# Patient Record
Sex: MALE | Race: WHITE | NOT HISPANIC OR LATINO | Employment: FULL TIME | URBAN - METROPOLITAN AREA
[De-identification: names, ages, dates, MRNs, and addresses within clinical notes are randomized per-mention and may not be internally consistent; named-entity substitution may affect disease eponyms.]

---

## 2024-01-31 ENCOUNTER — OFFICE VISIT (OUTPATIENT)
Dept: PRIMARY CARE | Facility: CLINIC | Age: 44
End: 2024-01-31
Payer: COMMERCIAL

## 2024-01-31 VITALS
HEIGHT: 75 IN | OXYGEN SATURATION: 100 % | DIASTOLIC BLOOD PRESSURE: 86 MMHG | HEART RATE: 57 BPM | WEIGHT: 212 LBS | TEMPERATURE: 97.5 F | SYSTOLIC BLOOD PRESSURE: 127 MMHG | BODY MASS INDEX: 26.36 KG/M2

## 2024-01-31 DIAGNOSIS — R07.9 CHEST PAIN, UNSPECIFIED TYPE: Primary | ICD-10-CM

## 2024-01-31 PROBLEM — B00.1 RECURRENT COLD SORES: Status: ACTIVE | Noted: 2024-01-31

## 2024-01-31 PROBLEM — E78.00 ELEVATED LDL CHOLESTEROL LEVEL: Status: ACTIVE | Noted: 2024-01-31

## 2024-01-31 PROBLEM — E55.9 VITAMIN D DEFICIENCY: Status: ACTIVE | Noted: 2024-01-31

## 2024-01-31 PROCEDURE — 99213 OFFICE O/P EST LOW 20 MIN: CPT | Performed by: INTERNAL MEDICINE

## 2024-01-31 PROCEDURE — 1036F TOBACCO NON-USER: CPT | Performed by: INTERNAL MEDICINE

## 2024-01-31 PROCEDURE — 93000 ELECTROCARDIOGRAM COMPLETE: CPT | Performed by: INTERNAL MEDICINE

## 2024-01-31 RX ORDER — VALACYCLOVIR HYDROCHLORIDE 500 MG/1
500 TABLET, FILM COATED ORAL DAILY
COMMUNITY
End: 2024-04-24

## 2024-01-31 RX ORDER — ATORVASTATIN CALCIUM 20 MG/1
20 TABLET, FILM COATED ORAL NIGHTLY
COMMUNITY

## 2024-01-31 ASSESSMENT — PATIENT HEALTH QUESTIONNAIRE - PHQ9
1. LITTLE INTEREST OR PLEASURE IN DOING THINGS: NOT AT ALL
2. FEELING DOWN, DEPRESSED OR HOPELESS: NOT AT ALL
SUM OF ALL RESPONSES TO PHQ9 QUESTIONS 1 AND 2: 0

## 2024-01-31 ASSESSMENT — ENCOUNTER SYMPTOMS
WHEEZING: 0
FEVER: 0
VOMITING: 0
CHILLS: 0
ARTHRALGIAS: 0
DYSURIA: 0
CONSTIPATION: 0
NAUSEA: 0
BACK PAIN: 0
PALPITATIONS: 0
DIARRHEA: 0
DIZZINESS: 0
FATIGUE: 0
ABDOMINAL PAIN: 0
COUGH: 0
SHORTNESS OF BREATH: 0
LIGHT-HEADEDNESS: 0
HEMATURIA: 0

## 2024-01-31 ASSESSMENT — PAIN SCALES - GENERAL: PAINLEVEL: 3

## 2024-01-31 NOTE — PROGRESS NOTES
CHIEF COMPLAINT  Follow-up (When running has chest muscle hurts sticks around and varies in time )    HISTORY OF PRESENT ILLNESS  Jacobo Higuera is a 43 y.o. male presents today for follow up of Follow-up (When running has chest muscle hurts sticks around and varies in time )    HPI    Cortisone shot in right heel last month.  Was advised to not run for a few weeks to let his heel heal.  Started running again.  The first time, after his run, he felt some pain across his chest, thought it was his muscles.  Seems to be getting worse - requires less time running for symptoms to begin and more intense.  Tried to run this morning - triggers the pain across the chest, had to stop.  No dizziness, diaphoresis, nausea.  The pain is across the chest, but more so on the left.  Also occurred when shoveling snow.  Lately the pain improves, but still lingers there.  Sometimes feels tightness in his chest like he just worked out, but otherwise achy.  At first he thought it was triggered by cold air, but then one morning it was 50 degrees and it still happened.   No wheezing.    REVIEW OF SYSTEMS  Review of Systems   Constitutional:  Negative for chills, fatigue and fever.   Respiratory:  Negative for cough, shortness of breath and wheezing.    Cardiovascular:  Positive for chest pain. Negative for palpitations and leg swelling.   Gastrointestinal:  Negative for abdominal pain, constipation, diarrhea, nausea and vomiting.   Genitourinary:  Negative for dysuria and hematuria.   Musculoskeletal:  Negative for arthralgias, back pain and gait problem.   Skin:  Negative for rash.   Neurological:  Negative for dizziness and light-headedness.       ALLERGIES  Patient has no known allergies.    MEDICATIONS  Current Outpatient Medications   Medication Instructions    atorvastatin (LIPITOR) 20 mg, oral, Nightly    valACYclovir (VALTREX) 500 mg, oral, Daily       TOBACCO USE  Social History     Tobacco Use   Smoking Status Never   Smokeless  "Tobacco Never       DEPRESSION SCREEN  Over the past 2 weeks, how often have you been bothered by any of the following problems?  Little interest or pleasure in doing things: Not at all  Feeling down, depressed, or hopeless: Not at all    SURGICAL HISTORY  No past surgical history on file.       OBJECTIVE    /86   Pulse 57   Temp 36.4 °C (97.5 °F) (Temporal)   Ht 1.905 m (6' 3\")   Wt 96.2 kg (212 lb)   SpO2 100%   BMI 26.50 kg/m²    BMI: Estimated body mass index is 26.5 kg/m² as calculated from the following:    Height as of this encounter: 1.905 m (6' 3\").    Weight as of this encounter: 96.2 kg (212 lb).    BP Readings from Last 3 Encounters:   01/31/24 127/86   05/02/23 99/79   05/05/22 113/73      Wt Readings from Last 3 Encounters:   01/31/24 96.2 kg (212 lb)   05/02/23 99 kg (218 lb 3 oz)   05/05/22 96.2 kg (212 lb 1 oz)        PHYSICAL EXAM  Physical Exam  Constitutional:       Appearance: Normal appearance.   HENT:      Head: Normocephalic and atraumatic.   Cardiovascular:      Rate and Rhythm: Normal rate and regular rhythm.      Heart sounds: No murmur heard.  Pulmonary:      Effort: Pulmonary effort is normal. No respiratory distress.      Breath sounds: Normal breath sounds. No wheezing.   Abdominal:      General: There is no distension.      Palpations: Abdomen is soft.      Tenderness: There is no abdominal tenderness.   Neurological:      General: No focal deficit present.      Mental Status: He is alert and oriented to person, place, and time. Mental status is at baseline.   Psychiatric:         Mood and Affect: Mood normal.         Behavior: Behavior normal.         Thought Content: Thought content normal.         Judgment: Judgment normal.           ASSESSMENT AND PLAN  Assessment/Plan   Problem List Items Addressed This Visit       Chest pain - Primary    Relevant Orders    Stress Test    ECG 12 lead (Clinic Performed)    ECG 12 Lead (Completed)     Chest pain - triggered by exertion. "  EKG today in office shows sinus bradycardia (HR 54).  Vitals are OK.  Denies wheezing.  The pain abates with rest, but lingers at mild level for a while afterwards.  Obtain exercise stress test.  Will reassess after this is complete.  Advised that if symptoms come on with rest, if they are more severe, or do not resolve, should go to ER.

## 2024-02-19 ENCOUNTER — HOSPITAL ENCOUNTER (OUTPATIENT)
Dept: CARDIOLOGY | Facility: CLINIC | Age: 44
Discharge: HOME | End: 2024-02-19
Payer: COMMERCIAL

## 2024-02-19 DIAGNOSIS — R07.9 CHEST PAIN, UNSPECIFIED TYPE: ICD-10-CM

## 2024-02-19 PROCEDURE — 93017 CV STRESS TEST TRACING ONLY: CPT

## 2024-02-19 PROCEDURE — 93018 CV STRESS TEST I&R ONLY: CPT | Performed by: STUDENT IN AN ORGANIZED HEALTH CARE EDUCATION/TRAINING PROGRAM

## 2024-02-19 PROCEDURE — 93016 CV STRESS TEST SUPVJ ONLY: CPT | Performed by: STUDENT IN AN ORGANIZED HEALTH CARE EDUCATION/TRAINING PROGRAM

## 2024-02-27 DIAGNOSIS — R06.09 DYSPNEA ON EXERTION: Primary | ICD-10-CM

## 2024-02-28 RX ORDER — ALBUTEROL SULFATE 90 UG/1
AEROSOL, METERED RESPIRATORY (INHALATION)
Qty: 8.5 G | Refills: 3 | Status: SHIPPED | OUTPATIENT
Start: 2024-02-28

## 2024-04-24 DIAGNOSIS — B00.1 HERPESVIRAL VESICULAR DERMATITIS: ICD-10-CM

## 2024-04-24 RX ORDER — VALACYCLOVIR HYDROCHLORIDE 500 MG/1
500 TABLET, FILM COATED ORAL DAILY
Qty: 90 TABLET | Refills: 3 | Status: SHIPPED | OUTPATIENT
Start: 2024-04-24

## 2024-06-24 ENCOUNTER — APPOINTMENT (OUTPATIENT)
Dept: PRIMARY CARE | Facility: CLINIC | Age: 44
End: 2024-06-24
Payer: COMMERCIAL

## 2024-06-24 VITALS
SYSTOLIC BLOOD PRESSURE: 106 MMHG | HEIGHT: 75 IN | HEART RATE: 65 BPM | BODY MASS INDEX: 25.78 KG/M2 | WEIGHT: 207.3 LBS | DIASTOLIC BLOOD PRESSURE: 77 MMHG | OXYGEN SATURATION: 97 % | TEMPERATURE: 97.2 F

## 2024-06-24 DIAGNOSIS — E78.00 ELEVATED LDL CHOLESTEROL LEVEL: ICD-10-CM

## 2024-06-24 DIAGNOSIS — B00.1 HERPESVIRAL VESICULAR DERMATITIS: ICD-10-CM

## 2024-06-24 DIAGNOSIS — Z00.00 HEALTHCARE MAINTENANCE: Primary | ICD-10-CM

## 2024-06-24 LAB
NON-UH HIE A/G RATIO: 1.3
NON-UH HIE ALB: 3.9 G/DL (ref 3.4–5)
NON-UH HIE ALK PHOS: 72 UNIT/L (ref 45–117)
NON-UH HIE BASO COUNT: 0.04 X1000 (ref 0–0.2)
NON-UH HIE BASOS %: 0.6 %
NON-UH HIE BILIRUBIN, TOTAL: 0.7 MG/DL (ref 0.3–1.2)
NON-UH HIE BUN/CREAT RATIO: 17.5
NON-UH HIE BUN: 21 MG/DL (ref 9–23)
NON-UH HIE CALCIUM: 9.3 MG/DL (ref 8.7–10.4)
NON-UH HIE CALCULATED LDL CHOLESTEROL: 73 MG/DL (ref 60–130)
NON-UH HIE CALCULATED OSMOLALITY: 286 MOSM/KG (ref 275–295)
NON-UH HIE CHLORIDE: 108 MMOL/L (ref 98–107)
NON-UH HIE CHOLESTEROL: 137 MG/DL (ref 100–200)
NON-UH HIE CO2, VENOUS: 29 MMOL/L (ref 20–31)
NON-UH HIE CREATININE: 1.2 MG/DL (ref 0.6–1.1)
NON-UH HIE DIFF?: NO
NON-UH HIE EOS COUNT: 0.06 X1000 (ref 0–0.5)
NON-UH HIE EOSIN %: 1 %
NON-UH HIE GFR AA: >60
NON-UH HIE GLOBULIN: 3 G/DL
NON-UH HIE GLOMERULAR FILTRATION RATE: >60 ML/MIN/1.73M?
NON-UH HIE GLUCOSE: 97 MG/DL (ref 74–106)
NON-UH HIE GOT: 22 UNIT/L (ref 15–37)
NON-UH HIE GPT: 29 UNIT/L (ref 10–49)
NON-UH HIE HCT: 45.2 % (ref 41–52)
NON-UH HIE HDL CHOLESTEROL: 42 MG/DL (ref 40–60)
NON-UH HIE HGB: 15.1 G/DL (ref 13.5–17.5)
NON-UH HIE INSTR WBC: 5.6
NON-UH HIE K: 5 MMOL/L (ref 3.5–5.1)
NON-UH HIE LYMPH %: 31.6 %
NON-UH HIE LYMPH COUNT: 1.76 X1000 (ref 1.2–4.8)
NON-UH HIE MCH: 28.7 PG (ref 27–34)
NON-UH HIE MCHC: 33.3 G/DL (ref 32–37)
NON-UH HIE MCV: 85.9 FL (ref 80–100)
NON-UH HIE MONO %: 7.8 %
NON-UH HIE MONO COUNT: 0.44 X1000 (ref 0.1–1)
NON-UH HIE MPV: 9.1 FL (ref 7.4–10.4)
NON-UH HIE NA: 142 MMOL/L (ref 135–145)
NON-UH HIE NEUTROPHIL %: 58.9 %
NON-UH HIE NEUTROPHIL COUNT (ANC): 3.27 X1000 (ref 1.4–8.8)
NON-UH HIE NUCLEATED RBC: 0 /100WBC
NON-UH HIE PLATELET: 165 X10 (ref 150–450)
NON-UH HIE RBC: 5.26 X10 (ref 4.7–6.1)
NON-UH HIE RDW: 13.1 % (ref 11.5–14.5)
NON-UH HIE TOTAL CHOL/HDL CHOL RATIO: 3.3
NON-UH HIE TOTAL PROTEIN: 6.9 G/DL (ref 5.7–8.2)
NON-UH HIE TRIGLYCERIDES: 112 MG/DL (ref 30–150)
NON-UH HIE WBC: 5.6 X10 (ref 4.5–11)

## 2024-06-24 PROCEDURE — 1036F TOBACCO NON-USER: CPT | Performed by: INTERNAL MEDICINE

## 2024-06-24 PROCEDURE — 99396 PREV VISIT EST AGE 40-64: CPT | Performed by: INTERNAL MEDICINE

## 2024-06-24 RX ORDER — ATORVASTATIN CALCIUM 20 MG/1
20 TABLET, FILM COATED ORAL NIGHTLY
Qty: 90 TABLET | Refills: 3 | Status: SHIPPED | OUTPATIENT
Start: 2024-06-24 | End: 2025-06-24

## 2024-06-24 RX ORDER — VALACYCLOVIR HYDROCHLORIDE 500 MG/1
500 TABLET, FILM COATED ORAL DAILY
Qty: 90 TABLET | Refills: 3 | Status: SHIPPED | OUTPATIENT
Start: 2024-06-24

## 2024-06-24 ASSESSMENT — PATIENT HEALTH QUESTIONNAIRE - PHQ9
2. FEELING DOWN, DEPRESSED OR HOPELESS: NOT AT ALL
SUM OF ALL RESPONSES TO PHQ9 QUESTIONS 1 AND 2: 0
1. LITTLE INTEREST OR PLEASURE IN DOING THINGS: NOT AT ALL

## 2024-06-24 ASSESSMENT — ENCOUNTER SYMPTOMS
NAUSEA: 0
PALPITATIONS: 0
CONSTIPATION: 0
WHEEZING: 0
CHILLS: 0
ABDOMINAL PAIN: 0
DYSPHORIC MOOD: 0
NERVOUS/ANXIOUS: 0
SHORTNESS OF BREATH: 0
DIARRHEA: 0
FEVER: 0
DYSURIA: 0
DIZZINESS: 0
BACK PAIN: 0
SORE THROAT: 0
HEMATURIA: 0
COUGH: 0
FATIGUE: 0
ARTHRALGIAS: 0
LIGHT-HEADEDNESS: 0

## 2024-06-24 NOTE — PROGRESS NOTES
"CHIEF COMPLAINT  Annual Exam    HISTORY OF PRESENT ILLNESS  Jacobo Higuera is a 44 y.o. male presents today for follow up of Annual Exam    HPI    History reviewed and updated.  Stable on current medications, needs refills.   Fasting for routine labs.  Previous chest pain has resolved.  Had evaluation including stress test, which was negative.  Has Rx for albuterol inhaler, but has not needed to use it.     REVIEW OF SYSTEMS  Review of Systems   Constitutional:  Negative for chills, fatigue and fever.   HENT:  Negative for sore throat.    Respiratory:  Negative for cough, shortness of breath and wheezing.    Cardiovascular:  Negative for chest pain, palpitations and leg swelling.   Gastrointestinal:  Negative for abdominal pain, constipation, diarrhea and nausea.   Genitourinary:  Negative for dysuria and hematuria.   Musculoskeletal:  Negative for arthralgias, back pain and gait problem.   Skin:  Negative for rash.   Neurological:  Negative for dizziness and light-headedness.   Psychiatric/Behavioral:  Negative for dysphoric mood. The patient is not nervous/anxious.        ALLERGIES  Patient has no known allergies.    MEDICATIONS  Current Outpatient Medications   Medication Instructions    albuterol (ProAir HFA) 90 mcg/actuation inhaler 2 puffs by inhalation 30 minutes prior to activity or for wheezing/dyspnea, every 6 hours as needed.    atorvastatin (LIPITOR) 20 mg, oral, Nightly    valACYclovir (VALTREX) 500 mg, oral, Daily       TOBACCO USE  Social History     Tobacco Use   Smoking Status Never   Smokeless Tobacco Never       DEPRESSION SCREEN  Over the past 2 weeks, how often have you been bothered by any of the following problems?  Little interest or pleasure in doing things: Not at all  Feeling down, depressed, or hopeless: Not at all    SURGICAL HISTORY  No past surgical history on file.       OBJECTIVE    /77   Pulse 65   Temp 36.2 °C (97.2 °F)   Ht 1.905 m (6' 3\")   Wt 94 kg (207 lb 4.8 oz)   " "SpO2 97%   BMI 25.91 kg/m²    BMI: Estimated body mass index is 25.91 kg/m² as calculated from the following:    Height as of this encounter: 1.905 m (6' 3\").    Weight as of this encounter: 94 kg (207 lb 4.8 oz).    BP Readings from Last 3 Encounters:   06/24/24 106/77   01/31/24 127/86   05/02/23 99/79      Wt Readings from Last 3 Encounters:   06/24/24 94 kg (207 lb 4.8 oz)   01/31/24 96.2 kg (212 lb)   05/02/23 99 kg (218 lb 3 oz)        PHYSICAL EXAM  Physical Exam  Constitutional:       Appearance: Normal appearance.   HENT:      Head: Normocephalic and atraumatic.      Right Ear: Tympanic membrane and ear canal normal.      Left Ear: Tympanic membrane and ear canal normal.   Cardiovascular:      Rate and Rhythm: Normal rate and regular rhythm.      Heart sounds: No murmur heard.  Pulmonary:      Effort: Pulmonary effort is normal. No respiratory distress.      Breath sounds: Normal breath sounds. No wheezing.   Abdominal:      General: There is no distension.      Palpations: Abdomen is soft.      Tenderness: There is no abdominal tenderness.   Musculoskeletal:      Right lower leg: No edema.      Left lower leg: No edema.   Skin:     Findings: No rash.   Neurological:      General: No focal deficit present.      Mental Status: He is alert and oriented to person, place, and time. Mental status is at baseline.      Gait: Gait normal.   Psychiatric:         Mood and Affect: Mood normal.         Behavior: Behavior normal.         Thought Content: Thought content normal.         Judgment: Judgment normal.          ASSESSMENT AND PLAN  Assessment/Plan   Problem List Items Addressed This Visit       Elevated LDL cholesterol level    Relevant Medications    atorvastatin (Lipitor) 20 mg tablet     Other Visit Diagnoses       Healthcare maintenance    -  Primary    Relevant Orders    Lipid Panel    Comprehensive Metabolic Panel    CBC and Auto Differential    Herpesviral vesicular dermatitis        Relevant " Medications    valACYclovir (Valtrex) 500 mg tablet            Well Visit     BP is normal. Continue healthy habits.  Aim for 30 minutes of aerobic exercise, 5 times per week.  Try to cut back on processed foods, including foods made with flour, sugar, added salt, and saturated fat.      High LDL - continue statin.     Check fasting labs - CBC, CMP, FLP.    Previous chest pain has not reoccurred.  Cardiac evaluation was negative.  Encouraged him to follow-up if any recurrence / worsening.     Colon cancer screening - start age 45.     Reviewed signs of skin cancer and importance of sun protection.      Continue to stay current with routine dental and eye exams.     Flu shot recommended annually in the Fall.      Follow-up annually.

## 2025-01-15 DIAGNOSIS — Z12.11 ENCOUNTER FOR SCREENING FOR MALIGNANT NEOPLASM OF COLON: Primary | ICD-10-CM

## 2025-03-07 RX ORDER — VIT C/E/ZN/COPPR/LUTEIN/ZEAXAN 250MG-90MG
25 CAPSULE ORAL DAILY
COMMUNITY

## 2025-03-10 ENCOUNTER — HOSPITAL ENCOUNTER (OUTPATIENT)
Dept: GASTROENTEROLOGY | Facility: HOSPITAL | Age: 45
Discharge: HOME | End: 2025-03-10
Payer: COMMERCIAL

## 2025-03-10 ENCOUNTER — ANESTHESIA EVENT (OUTPATIENT)
Dept: GASTROENTEROLOGY | Facility: HOSPITAL | Age: 45
End: 2025-03-10
Payer: COMMERCIAL

## 2025-03-10 ENCOUNTER — ANESTHESIA (OUTPATIENT)
Dept: GASTROENTEROLOGY | Facility: HOSPITAL | Age: 45
End: 2025-03-10
Payer: COMMERCIAL

## 2025-03-10 VITALS
OXYGEN SATURATION: 99 % | HEART RATE: 58 BPM | TEMPERATURE: 97.5 F | RESPIRATION RATE: 17 BRPM | HEIGHT: 75 IN | SYSTOLIC BLOOD PRESSURE: 145 MMHG | DIASTOLIC BLOOD PRESSURE: 95 MMHG | WEIGHT: 207.23 LBS | BODY MASS INDEX: 25.77 KG/M2

## 2025-03-10 DIAGNOSIS — Z12.11 ENCOUNTER FOR SCREENING FOR MALIGNANT NEOPLASM OF COLON: ICD-10-CM

## 2025-03-10 PROCEDURE — 2500000004 HC RX 250 GENERAL PHARMACY W/ HCPCS (ALT 636 FOR OP/ED): Performed by: ANESTHESIOLOGIST ASSISTANT

## 2025-03-10 PROCEDURE — A45385 PR COLONOSCOPY,REMV LESN,SNARE: Performed by: ANESTHESIOLOGIST ASSISTANT

## 2025-03-10 PROCEDURE — 3700000002 HC GENERAL ANESTHESIA TIME - EACH INCREMENTAL 1 MINUTE

## 2025-03-10 PROCEDURE — 7100000009 HC PHASE TWO TIME - INITIAL BASE CHARGE

## 2025-03-10 PROCEDURE — 7100000010 HC PHASE TWO TIME - EACH INCREMENTAL 1 MINUTE

## 2025-03-10 PROCEDURE — 45385 COLONOSCOPY W/LESION REMOVAL: CPT | Performed by: INTERNAL MEDICINE

## 2025-03-10 PROCEDURE — 3700000001 HC GENERAL ANESTHESIA TIME - INITIAL BASE CHARGE

## 2025-03-10 PROCEDURE — A45385 PR COLONOSCOPY,REMV LESN,SNARE: Performed by: ANESTHESIOLOGY

## 2025-03-10 RX ORDER — LIDOCAINE HCL/PF 100 MG/5ML
SYRINGE (ML) INTRAVENOUS AS NEEDED
Status: DISCONTINUED | OUTPATIENT
Start: 2025-03-10 | End: 2025-03-10

## 2025-03-10 RX ORDER — PROPOFOL 10 MG/ML
INJECTION, EMULSION INTRAVENOUS AS NEEDED
Status: DISCONTINUED | OUTPATIENT
Start: 2025-03-10 | End: 2025-03-10

## 2025-03-10 RX ADMIN — LIDOCAINE HYDROCHLORIDE 100 MG: 20 INJECTION, SOLUTION INTRAVENOUS at 09:20

## 2025-03-10 RX ADMIN — PROPOFOL 150 MG: 10 INJECTION, EMULSION INTRAVENOUS at 09:20

## 2025-03-10 RX ADMIN — PROPOFOL 100 MCG/KG/MIN: 10 INJECTION, EMULSION INTRAVENOUS at 09:21

## 2025-03-10 SDOH — HEALTH STABILITY: MENTAL HEALTH: CURRENT SMOKER: 0

## 2025-03-10 ASSESSMENT — PAIN - FUNCTIONAL ASSESSMENT
PAIN_FUNCTIONAL_ASSESSMENT: 0-10
PAIN_FUNCTIONAL_ASSESSMENT: VAS (VISUAL ANALOG SCALE)

## 2025-03-10 ASSESSMENT — PAIN SCALES - GENERAL
PAINLEVEL_OUTOF10: 0 - NO PAIN

## 2025-03-10 NOTE — H&P
Outpatient Hospital Procedure    Patient Profile-Procedures  Initial Info  Patient Demographics  Name Jacobo Higuera  Date of Birth 1980  MRN [id number]  Address   [address] [place] [address] [place] [id number] [facility] [place] [address]    Primary Phone Number [phone]  Secondary Phone Number    PCP Kassie Maddox    Procedures   Colonoscopy      Indication:  CRC screen    Primary contact name and number   Extended Emergency Contact Information  Primary Emergency Contact: GINNY MENCHACA  Address: [address] [place] [address] [place] [address] [facility]  Home Phone: [phone]  Work Phone: [phone]  Mobile Phone: [phone]  Relation: Spouse   needed? No    General Health  Weight   Vitals:    03/10/25 0850   Weight: 94 kg (207 lb 3.7 oz)     BMI Body mass index is 25.9 kg/m².    Allergies  No Known Allergies    Past Medical History   Past Medical History:   Diagnosis Date    Body mass index (BMI) 26.0-26.9, adult 01/16/2020    Body mass index (BMI) of 26.0 to 26.9 in adult    COVID-19     COVID-19 virus infection    Encounter for general adult medical examination without abnormal findings 01/17/2019    Encounter for preventive health examination    Encounter for immunization     Encounter for immunization    Other conditions influencing health status     No significant past medical history    Personal history of other diseases of the respiratory system 07/10/2014    History of acute pharyngitis    Personal history of other specified conditions 07/23/2018    History of nausea    Spasm of muscle 06/11/2013    Tinea unguium 01/16/2020    Onychomycosis of toenail    Vasectomy status 01/16/2020    History of vasectomy       Provider assessment  Diagnosis  Medication Reviewed - yes  Prior to Admission medications    Medication Sig Start Date End Date Taking? Authorizing Provider   atorvastatin (Lipitor) 20 mg tablet Take 1 tablet (20 mg) by mouth once daily at  bedtime. 6/24/24 6/24/25 Yes Kassie Maddox MD   cholecalciferol (Vitamin D-3) 25 MCG (1000 UT) capsule Take 1 capsule (25 mcg) by mouth once daily.   Yes Historical Provider, MD   valACYclovir (Valtrex) 500 mg tablet Take 1 tablet (500 mg) by mouth once daily. 6/24/24  Yes Kassie Maddox MD   albuterol (ProAir HFA) 90 mcg/actuation inhaler 2 puffs by inhalation 30 minutes prior to activity or for wheezing/dyspnea, every 6 hours as needed. 2/28/24   Kassie Maddox MD       This is my H&P    Physical Exam  Physical Exam  Constitutional:       Appearance: Normal appearance.   HENT:      Head: Normocephalic.      Mouth/Throat:      Mouth: Mucous membranes are moist.   Eyes:      Extraocular Movements: Extraocular movements intact.      Pupils: Pupils are equal, round, and reactive to light.   Cardiovascular:      Rate and Rhythm: Normal rate and regular rhythm.      Pulses: Normal pulses.      Heart sounds: Normal heart sounds.   Pulmonary:      Effort: Pulmonary effort is normal.      Breath sounds: Normal breath sounds.   Abdominal:      General: Bowel sounds are normal.      Palpations: Abdomen is soft.   Neurological:      General: No focal deficit present.      Mental Status: He is alert.       ASA PS Classification 2  Sedation Plan Moderate  Procedure Plan - pre-procedural (re)assesment completed by physician:  discharge/transfer patient when discharge criteria met    Nuno Chavez MD  3/10/2025 9:16 AM

## 2025-03-10 NOTE — ANESTHESIA PREPROCEDURE EVALUATION
Patient: Jacobo Higuera    Procedure Information       Date/Time: 03/10/25 0930    Scheduled providers: Nuno Chavez MD; Claude Moise MD    Procedure: COLONOSCOPY    Location: [facility]            Relevant Problems   Anesthesia   (-) Difficult intubation   (-) PONV (postoperative nausea and vomiting)      Cardiac   (+) Chest pain      ID   (+) Recurrent cold sores       Clinical information reviewed:    Allergies  Meds               NPO Detail:  NPO/Void Status  Carbohydrate Drink Given Prior to Surgery? : N  Date of Last Liquid: 03/09/25  Time of Last Liquid: 2200  Date of Last Solid: 03/09/25  Time of Last Solid: 0700  Last Intake Type: Clear fluids         Physical Exam    Airway  Mallampati: II  TM distance: >3 FB  Neck ROM: full     Cardiovascular - normal exam  Rhythm: regular  Rate: normal     Dental    Pulmonary - normal exam     Abdominal            Anesthesia Plan    History of general anesthesia?: yes  History of complications of general anesthesia?: no    ASA 2     MAC     The patient is not a current smoker.  Education provided regarding risk of obstructive sleep apnea.

## 2025-03-10 NOTE — ANESTHESIA POSTPROCEDURE EVALUATION
Patient: Jacobo Higuera    Procedure Summary       Date: 03/10/25 Room / Location: [facility]    Anesthesia Start: 0917 Anesthesia Stop: 0942    Procedure: COLONOSCOPY Diagnosis: Encounter for screening for malignant neoplasm of colon    Scheduled Providers: Nuno Chavez MD; Claude Moise MD Responsible Provider: Claude Moise MD    Anesthesia Type: MAC ASA Status: 2            Anesthesia Type: MAC    Vitals Value Taken Time   BP    03/10/25 0942   Temp    03/10/25 0942   Pulse    03/10/25 0942   Resp    03/10/25 0942   SpO2    03/10/25 0942       Anesthesia Post Evaluation    Patient location during evaluation: PACU  Patient participation: complete - patient participated  Level of consciousness: awake and alert  Pain management: satisfactory to patient  Airway patency: patent  Cardiovascular status: acceptable  Respiratory status: acceptable  Hydration status: acceptable  Postoperative Nausea and Vomiting: none        No notable events documented.

## 2025-03-17 LAB
LABORATORY COMMENT REPORT: NORMAL
PATH REPORT.FINAL DX SPEC: NORMAL
PATH REPORT.GROSS SPEC: NORMAL
PATH REPORT.MICROSCOPIC SPEC OTHER STN: NORMAL
PATH REPORT.RELEVANT HX SPEC: NORMAL
PATH REPORT.TOTAL CANCER: NORMAL

## 2025-06-15 DIAGNOSIS — E78.00 ELEVATED LDL CHOLESTEROL LEVEL: ICD-10-CM

## 2025-06-15 RX ORDER — ATORVASTATIN CALCIUM 20 MG/1
20 TABLET, FILM COATED ORAL NIGHTLY
Qty: 90 TABLET | Refills: 3 | Status: SHIPPED | OUTPATIENT
Start: 2025-06-15 | End: 2026-06-15

## 2025-06-24 ENCOUNTER — APPOINTMENT (OUTPATIENT)
Dept: PRIMARY CARE | Facility: CLINIC | Age: 45
End: 2025-06-24
Payer: COMMERCIAL

## 2025-06-24 VITALS
HEART RATE: 67 BPM | SYSTOLIC BLOOD PRESSURE: 106 MMHG | BODY MASS INDEX: 26.04 KG/M2 | HEIGHT: 75 IN | TEMPERATURE: 97.2 F | OXYGEN SATURATION: 96 % | WEIGHT: 209.4 LBS | DIASTOLIC BLOOD PRESSURE: 82 MMHG

## 2025-06-24 DIAGNOSIS — J02.9 PHARYNGITIS, UNSPECIFIED ETIOLOGY: ICD-10-CM

## 2025-06-24 DIAGNOSIS — B00.1 HERPESVIRAL VESICULAR DERMATITIS: ICD-10-CM

## 2025-06-24 DIAGNOSIS — Z00.00 HEALTHCARE MAINTENANCE: Primary | ICD-10-CM

## 2025-06-24 DIAGNOSIS — E78.00 ELEVATED LDL CHOLESTEROL LEVEL: ICD-10-CM

## 2025-06-24 DIAGNOSIS — B00.1 RECURRENT COLD SORES: ICD-10-CM

## 2025-06-24 PROBLEM — R07.9 CHEST PAIN: Status: RESOLVED | Noted: 2024-01-31 | Resolved: 2025-06-24

## 2025-06-24 LAB — POC RAPID STREP: NEGATIVE

## 2025-06-24 PROCEDURE — 99396 PREV VISIT EST AGE 40-64: CPT | Performed by: INTERNAL MEDICINE

## 2025-06-24 PROCEDURE — 3008F BODY MASS INDEX DOCD: CPT | Performed by: INTERNAL MEDICINE

## 2025-06-24 PROCEDURE — 1036F TOBACCO NON-USER: CPT | Performed by: INTERNAL MEDICINE

## 2025-06-24 PROCEDURE — 87880 STREP A ASSAY W/OPTIC: CPT | Performed by: INTERNAL MEDICINE

## 2025-06-24 RX ORDER — VALACYCLOVIR HYDROCHLORIDE 500 MG/1
500 TABLET, FILM COATED ORAL DAILY
Qty: 90 TABLET | Refills: 3 | Status: SHIPPED | OUTPATIENT
Start: 2025-06-24

## 2025-06-24 RX ORDER — ATORVASTATIN CALCIUM 10 MG/1
10 TABLET, FILM COATED ORAL NIGHTLY
Qty: 90 TABLET | Refills: 3 | OUTPATIENT
Start: 2025-06-24 | End: 2026-06-24

## 2025-06-24 ASSESSMENT — ENCOUNTER SYMPTOMS
SHORTNESS OF BREATH: 0
SORE THROAT: 1
WHEEZING: 0
SINUS PAIN: 0
FATIGUE: 0
FEVER: 0
DYSURIA: 0
LIGHT-HEADEDNESS: 0
HEMATURIA: 0
NERVOUS/ANXIOUS: 0
SINUS PRESSURE: 0
DYSPHORIC MOOD: 0
CHILLS: 0
BACK PAIN: 0
DIARRHEA: 0
ARTHRALGIAS: 0
COUGH: 0
PALPITATIONS: 0
RHINORRHEA: 0
DIZZINESS: 0
CONSTIPATION: 0
NAUSEA: 0
ABDOMINAL PAIN: 0

## 2025-06-24 NOTE — PROGRESS NOTES
CHIEF COMPLAINT  Annual Exam    HISTORY OF PRESENT ILLNESS  Jacobo Higuera is a 45 y.o. male presents today for follow up of Annual Exam    HPI    Past Medical, Surgical, and Family History reviewed and updated in chart.  Reviewed all medications by prescribing practitioner or clinical pharmacist (such as prescriptions, OTCs, herbal therapies and supplements) and documented in the medical record.    Sore throat started yesterday.   Recently returned from vacation ([facility] trip, saw several shows).   No other symptoms.   Took COVID test last night, was negative.    Exercises regularly, follows well balanced diet.   Has surplus of Lipitor because he is cutting in half.  Due for fasting labs.     REVIEW OF SYSTEMS  Review of Systems   Constitutional:  Negative for chills, fatigue and fever.   HENT:  Positive for sore throat. Negative for congestion, rhinorrhea, sinus pressure and sinus pain.    Respiratory:  Negative for cough, shortness of breath and wheezing.    Cardiovascular:  Negative for chest pain, palpitations and leg swelling.   Gastrointestinal:  Negative for abdominal pain, constipation, diarrhea and nausea.   Genitourinary:  Negative for dysuria and hematuria.   Musculoskeletal:  Negative for arthralgias, back pain and gait problem.   Skin:  Negative for rash.   Neurological:  Negative for dizziness and light-headedness.   Psychiatric/Behavioral:  Negative for dysphoric mood. The patient is not nervous/anxious.        ALLERGIES  Patient has no known allergies.    MEDICATIONS  Current Outpatient Medications   Medication Instructions    atorvastatin (LIPITOR) 10 mg, oral, Nightly    cholecalciferol (VITAMIN D-3) 25 mcg, Daily    valACYclovir (VALTREX) 500 mg, oral, Daily       TOBACCO USE  Tobacco Use History[1]    DEPRESSION SCREEN  Over the past 2 weeks, how often have you been bothered by any of the following problems?  Little interest or pleasure in doing things: Not at all  Feeling down, depressed, or  "hopeless: Not at all    SURGICAL HISTORY  No past surgical history on file.       OBJECTIVE    /82   Pulse 67   Temp 36.2 °C (97.2 °F)   Ht 1.905 m (6' 3\")   Wt 95 kg (209 lb 6.4 oz)   SpO2 96%   BMI 26.17 kg/m²    BMI: Estimated body mass index is 26.17 kg/m² as calculated from the following:    Height as of this encounter: 1.905 m (6' 3\").    Weight as of this encounter: 95 kg (209 lb 6.4 oz).    BP Readings from Last 3 Encounters:   06/24/25 106/82   03/10/25 (!) 145/95   06/24/24 106/77      Wt Readings from Last 3 Encounters:   06/24/25 95 kg (209 lb 6.4 oz)   03/10/25 94 kg (207 lb 3.7 oz)   06/24/24 94 kg (207 lb 4.8 oz)        PHYSICAL EXAM  Physical Exam  Constitutional:       Appearance: Normal appearance.   HENT:      Head: Normocephalic and atraumatic.      Right Ear: Tympanic membrane and ear canal normal.      Left Ear: Tympanic membrane and ear canal normal.      Mouth/Throat:      Mouth: Mucous membranes are moist.      Pharynx: Oropharynx is clear. Posterior oropharyngeal erythema present. No oropharyngeal exudate.   Cardiovascular:      Rate and Rhythm: Normal rate and regular rhythm.      Heart sounds: No murmur heard.  Pulmonary:      Effort: Pulmonary effort is normal. No respiratory distress.      Breath sounds: Normal breath sounds. No wheezing.   Abdominal:      General: There is no distension.      Palpations: Abdomen is soft.      Tenderness: There is no abdominal tenderness.   Musculoskeletal:      Right lower leg: No edema.      Left lower leg: No edema.   Skin:     Findings: No rash.   Neurological:      General: No focal deficit present.      Mental Status: He is alert and oriented to person, place, and time. Mental status is at baseline.      Gait: Gait normal.   Psychiatric:         Mood and Affect: Mood normal.         Behavior: Behavior normal.         Thought Content: Thought content normal.         Judgment: Judgment normal.          ASSESSMENT AND " PLAN  Assessment/Plan   Problem List Items Addressed This Visit       Elevated LDL cholesterol level    Relevant Medications    atorvastatin (Lipitor) 10 mg tablet    Recurrent cold sores    Healthcare maintenance - Primary    Relevant Orders    Lipid Panel    Comprehensive Metabolic Panel    CBC and Auto Differential    Herpesviral vesicular dermatitis    Relevant Medications    valACYclovir (Valtrex) 500 mg tablet     Other Visit Diagnoses         Pharyngitis, unspecified etiology        Relevant Orders    POCT Rapid Strep A manually resulted            Well Visit     BP is normal. Continue healthy habits.  Aim for 30 minutes of aerobic exercise, 5 times per week.  Try to cut back on processed foods, including foods made with flour, sugar, added salt, and saturated fat.      High LDL - continue statin.     Check fasting labs - CBC, CMP, FLP.    Pharyngitis - Strep test negative.  Home COVID test negative.  Monitor for any changing symptoms and can message me if needed.  Continue supportive care.      Colonoscopy 3/10/25 - repeat 5 years.     Reviewed signs of skin cancer and importance of sun protection.      Continue to stay current with routine dental and eye exams.     Flu shot recommended annually in the Fall.   Tdap recommended (non-urgent).      Follow-up annually.            [1]   Social History  Tobacco Use   Smoking Status Never   Smokeless Tobacco Never

## 2025-06-25 LAB
ALBUMIN SERPL-MCNC: 4.4 G/DL (ref 3.6–5.1)
ALP SERPL-CCNC: 66 U/L (ref 36–130)
ALT SERPL-CCNC: 28 U/L (ref 9–46)
ANION GAP SERPL CALCULATED.4IONS-SCNC: 8 MMOL/L (CALC) (ref 7–17)
AST SERPL-CCNC: 20 U/L (ref 10–40)
BASOPHILS # BLD AUTO: 39 CELLS/UL (ref 0–200)
BASOPHILS NFR BLD AUTO: 0.7 %
BILIRUB SERPL-MCNC: 0.6 MG/DL (ref 0.2–1.2)
BUN SERPL-MCNC: 16 MG/DL (ref 7–25)
CALCIUM SERPL-MCNC: 9.5 MG/DL (ref 8.6–10.3)
CHLORIDE SERPL-SCNC: 104 MMOL/L (ref 98–110)
CHOLEST SERPL-MCNC: 172 MG/DL
CHOLEST/HDLC SERPL: 3.7 (CALC)
CO2 SERPL-SCNC: 29 MMOL/L (ref 20–32)
CREAT SERPL-MCNC: 1.15 MG/DL (ref 0.6–1.29)
EGFRCR SERPLBLD CKD-EPI 2021: 80 ML/MIN/1.73M2
EOSINOPHIL # BLD AUTO: 39 CELLS/UL (ref 15–500)
EOSINOPHIL NFR BLD AUTO: 0.7 %
ERYTHROCYTE [DISTWIDTH] IN BLOOD BY AUTOMATED COUNT: 13.5 % (ref 11–15)
GLUCOSE SERPL-MCNC: 85 MG/DL (ref 65–99)
HCT VFR BLD AUTO: 47.7 % (ref 38.5–50)
HDLC SERPL-MCNC: 47 MG/DL
HGB BLD-MCNC: 15.7 G/DL (ref 13.2–17.1)
LDLC SERPL CALC-MCNC: 95 MG/DL (CALC)
LYMPHOCYTES # BLD AUTO: 1540 CELLS/UL (ref 850–3900)
LYMPHOCYTES NFR BLD AUTO: 27.5 %
MCH RBC QN AUTO: 28.7 PG (ref 27–33)
MCHC RBC AUTO-ENTMCNC: 32.9 G/DL (ref 32–36)
MCV RBC AUTO: 87.2 FL (ref 80–100)
MONOCYTES # BLD AUTO: 420 CELLS/UL (ref 200–950)
MONOCYTES NFR BLD AUTO: 7.5 %
NEUTROPHILS # BLD AUTO: 3562 CELLS/UL (ref 1500–7800)
NEUTROPHILS NFR BLD AUTO: 63.6 %
NONHDLC SERPL-MCNC: 125 MG/DL (CALC)
PLATELET # BLD AUTO: 219 THOUSAND/UL (ref 140–400)
PMV BLD REES-ECKER: 10.5 FL (ref 7.5–12.5)
POTASSIUM SERPL-SCNC: 4.4 MMOL/L (ref 3.5–5.3)
PROT SERPL-MCNC: 7.3 G/DL (ref 6.1–8.1)
RBC # BLD AUTO: 5.47 MILLION/UL (ref 4.2–5.8)
SODIUM SERPL-SCNC: 141 MMOL/L (ref 135–146)
TRIGL SERPL-MCNC: 204 MG/DL
WBC # BLD AUTO: 5.6 THOUSAND/UL (ref 3.8–10.8)

## 2025-09-04 ENCOUNTER — APPOINTMENT (OUTPATIENT)
Dept: VASCULAR SURGERY | Facility: CLINIC | Age: 45
End: 2025-09-04
Payer: COMMERCIAL

## 2025-09-08 ENCOUNTER — APPOINTMENT (OUTPATIENT)
Dept: PRIMARY CARE | Facility: CLINIC | Age: 45
End: 2025-09-08
Payer: COMMERCIAL

## 2025-09-29 ENCOUNTER — APPOINTMENT (OUTPATIENT)
Dept: PRIMARY CARE | Facility: CLINIC | Age: 45
End: 2025-09-29
Payer: COMMERCIAL

## 2026-06-30 ENCOUNTER — APPOINTMENT (OUTPATIENT)
Dept: PRIMARY CARE | Facility: CLINIC | Age: 46
End: 2026-06-30
Payer: COMMERCIAL